# Patient Record
Sex: FEMALE | Race: WHITE
[De-identification: names, ages, dates, MRNs, and addresses within clinical notes are randomized per-mention and may not be internally consistent; named-entity substitution may affect disease eponyms.]

---

## 2018-11-02 ENCOUNTER — HOSPITAL ENCOUNTER (EMERGENCY)
Dept: HOSPITAL 44 - ED | Age: 38
LOS: 1 days | Discharge: HOME | End: 2018-11-03
Payer: SELF-PAY

## 2018-11-02 DIAGNOSIS — Z85.44: ICD-10-CM

## 2018-11-02 DIAGNOSIS — Z90.79: ICD-10-CM

## 2018-11-02 DIAGNOSIS — N10: Primary | ICD-10-CM

## 2018-11-02 LAB
BASOPHILS NFR BLD: 0.4 % (ref 0–1.5)
EGFR (NON-AFRICAN): > 60
EOSINOPHIL NFR BLD: 1.1 % (ref 0–6.8)
MCH RBC QN AUTO: 29.4 PG (ref 28–34)
MCV RBC AUTO: 89 FL (ref 80–100)
MONOCYTES %: 5.8 % (ref 0–11)
NEUTROPHILS #: 10 # K/UL (ref 1.4–7.7)

## 2018-11-02 PROCEDURE — 87186 SC STD MICRODIL/AGAR DIL: CPT

## 2018-11-02 PROCEDURE — 81002 URINALYSIS NONAUTO W/O SCOPE: CPT

## 2018-11-02 PROCEDURE — 96367 TX/PROPH/DG ADDL SEQ IV INF: CPT

## 2018-11-02 PROCEDURE — 87086 URINE CULTURE/COLONY COUNT: CPT

## 2018-11-02 PROCEDURE — 80053 COMPREHEN METABOLIC PANEL: CPT

## 2018-11-02 PROCEDURE — 74178 CT ABD&PLV WO CNTR FLWD CNTR: CPT

## 2018-11-02 PROCEDURE — 99285 EMERGENCY DEPT VISIT HI MDM: CPT

## 2018-11-02 PROCEDURE — 81025 URINE PREGNANCY TEST: CPT

## 2018-11-02 PROCEDURE — 85025 COMPLETE CBC W/AUTO DIFF WBC: CPT

## 2018-11-02 PROCEDURE — 96365 THER/PROPH/DIAG IV INF INIT: CPT

## 2018-11-02 PROCEDURE — S1016 NON-PVC INTRAVENOUS ADMINIST: HCPCS

## 2018-11-02 PROCEDURE — 96375 TX/PRO/DX INJ NEW DRUG ADDON: CPT

## 2018-11-02 NOTE — ED PHYSICIAN DOCUMENTATION
Flank Pain





- HISTORIAN


Historian: patient





- HPI


Stated Complaint: flank pain 


Chief Complaint: Flank Pain


Additional Information: 





Intro self as NP. Pt presents to the ED via EMS c/o Right flank pain since 033 

today. pain is 8/10 aching constant and radiates through her back  and around 

her right groin.  A0 pt has a hx of vulva cancer and has had 3 surgeries 

including tubal ligation. pt traveling through,  a . 





Pt reports nausea





pt denies current chest pain, dyspnea, syncope/near syncope, headache, 

dizziness, visual disturbances, v/d, fever, rash, sick contacts, dysuria, 

trauma. melena or hematochezia, change in bowel or bladder function. anxiety or 

depression. ROS Negative unless otherwise specified. 


Timing: worse


Severity: severe


Quality: aching


Associated Symptoms: nausea, back pain.  denies: fever, chills, vomiting, bloody

emesis, diarrhea, bloody stools, grossly bloody stools


Relieved by: nothing





- ROS


CONST: other (flank pain )


GI/: denies: constipation, black stools, bloody urine, bloody stools, dark 

urine, problems urinating


CVS/RESP: none


EYES/ENT: none


MS/SKIN/LYMPH: none


NEURO/PSYCH: none





- SOCIAL HX


Smoking History: less than 1 pack/day





- FAMILY HX


Family History: other (TIA Cancer asthma)





- PAST HX


Past History: other (vulva cancer with surgeries x 3 )


Surgeries/Procedures: 


Allergies: other (codeine )





- REVIEWED ASSESSMENTS


Nursing Assessment  Reviewed: Yes


Vitals Reviewed: Yes





ED Results Lab/Radiology





- Radiology


Radiology Impressions: 





CT abdomen and pelvis with and without contrast 





Date of study: 2018. 





CLINICAL HISTORY: SUDDEN ONSET MID BACK PAIN RADIATING TO RIGHT FLANK TODAY. 

CHRONIC HX OF HEMATURIA. HX OF VULVAR CANCER WITH SURGICAL REMOVAL AND VAGINAL 

REPAIR IN , CHOLECYSTECTOMY IN ,  X 3, TUBAL LIGATION LAST 

YEAR. WITHOUT AND WITH IV CONTRAST PER ORDER. 95 ML OMNI 350 IV CONTRAST GIVEN. 

(Hx) / ITS.REASON pain (DICOM Hx) 








TECHNIQUE: 


5 mm contiguous axial images of the abdomen and pelvis with and without IV 

contrast.  With and without; 95 ML OMNI 350 IV CONTRAST GIVEN   





FINDINGS: 


The lung bases are clear. 





Abdomen: The liver, pancreas and spleen are normal in appearance. Surgical clips

are present consistent with prior cholecystectomy. There is diminished 

enhancement the right renal lower pole with adjacent infiltration of the 

perinephric fat most consistent with right pyelonephritis. The left kidney enh

ances appropriately. There is no evidence of renal or ureteral calculi. The 

aorta is normal in caliber. The small and large bowel are nondistended. There is

no evidence of free air or free fluid. 





Pelvis: The colon is normal in appearance. The distal ureters and bladder are 

normal. There is no evidence of free air or free fluid. The sigmoid colon and 

rectum are normal. A right ovarian corpus luteum cyst is noted. The remaining 

pelvic structures are within normal limits and the bones of the pelvis are 

intact. 





IMPRESSION: 


Right renal lower pole pyelonephritis.


 


Electronically signed on Nov 3, 2018 12:09:24 AM CDT by:


Quita Aleman





- Orders


Orders: 





                                    ED Orders











 Category Date Time Status


 


 CBC/PLATELET/DIFF Routine Lab  18 Ordered


 


 CMP Routine Lab  18 Ordered


 


 URINALYSIS Routine Lab  18 Ordered


 


 URINE HCG Stat Lab  18 Uncollected


 


 Ketorolac Tromethamine [Toradol] Med  18 22:31 Once





 30 mg IVP NOW ONE   


 


 NORMAL SALINE @ 1000 MLS/HR ( 1000ml BOLUS) Med  18 22:34 Ordered





 0.9 % Sodium Chloride [Normal Saline] 1,000 ml   





 IV Q1H   


 


 Ondansetron HCl/Pf [Zofran 4 mg/2 ml] Med  18 22:33 Once





 4 mg IVP NOW ONE   














Abdominal Pain Physical Exam





- Physical Exam


General Appearance: mild distress


EENT: ENT inspection normal, no signs of dehydration


NECK: normal inspection.  No: lymphadenopathy


RESPIRATORY: no resp distress, chest non-tender, breath sounds normal.  No: 

wheezes, rales, rhonchi


CVS: reg rate & rhythm, heart sounds normal, equal pulses, no murmur


ABDOMEN: soft


BACK: CVA tenderness (R).  No: CVA tenderness (L)


SKIN: warm/dry, normal color


EXTREMITIES: non-tender


NEURO: oriented X3, motor nml, sensation nml, mood/affect nml, cognition normal





Discharge


Clincal Impression: 


 Pyelonephritis





Referrals: 


Primary Doctor,No [Primary Care Provider] - 2 Days


Additional Instructions: 


increase hydration 





Cipro 500 mg twice a day for 7 days





ibuprofen 600 mg every 6 hours for fever inflammation. 





tylenol 650 mg every 4-6 hours for fever/pain





we will send your urine off for culture to ensure you are on the appropriate 

antibiotic. we should have the result back in 3-4 days. 


you are welcome to call lab and check result if you have not heard anything. A 

Nurse will Review your culture report and contact you. 





Seek medical care immediately if worse: chest pain, shortness of breath, unable 

to urinate, feeling faint or passing out, or any concern. 





it is important that you follow up with your health care provider next week for 

urine recheck. 





UNDERSTAND THAT THIS IS AN EMERGENCY EVALUATION FOR YOUR COMPLAINT AND BY NATURE

IS LIMITED AND NOT A SUBSTITUTE FOR ONGOING MEDICAL CARE. EVEN THOUGH TEST 

RESULTS AND TREATMENT PLAN WERE EXPLAINED THERE MAY BE A NEED FOR ADDITIONAL 

TESTING TO FULLY DETERMINE THE EXTENT OF YOUR ILLNESS/INJURY/OR CONCERN SO YOU 

SHOULD CONTACT AND OR ESTABLISH WITH A PRIMARY CARE PROVIDER (OR REFERRAL DOCTOR

IF APPLICABLE) FOR AN APPOINTMENT AS SOON AS POSSIBLE.





Condition: Good


Disposition: 01 HOME, SELF-CARE


Decision to Admit: NO


Date of Decison to Admit: 18


Decision Time: 00:22

## 2018-11-03 VITALS — SYSTOLIC BLOOD PRESSURE: 100 MMHG | DIASTOLIC BLOOD PRESSURE: 61 MMHG

## 2018-11-03 LAB
APPEARANCE UR: CLEAR
COLOR,URINE: YELLOW
PH UR STRIP: 7 [PH] (ref 5–8)
RBC UR QL: (no result)
UROBILINOGEN URINE: 1 EU (ref 0.2–1)

## 2018-11-03 NOTE — DIAGNOSTIC IMAGING REPORT
SG KOLB 

[facility]

[id number] [facility] [address] [place] [address]. [phone]

 

 

 

 

Report Submission Date: Nov 3, 2018 12:09:24 AM CDT

Patient       Study

Name:   LANI DORAN       Date:   2018 11:40:02 PM CDT

MRN:   [id number]       Modality Type:   CT

Gender:   F       Description:   CT ABD PELVIS W W/

:   82       Institution:   [facility]

Physician:   SG KOLB

     Accession:    [id number]

 

 

CT abdomen and pelvis with and without contrast 



Date of study: 2018. 



CLINICAL HISTORY: SUDDEN ONSET MID BACK PAIN RADIATING TO RIGHT FLANK TODAY. 
CHRONIC HX OF HEMATURIA. HX OF VULVAR CANCER WITH SURGICAL REMOVAL AND VAGINAL 
REPAIR IN , CHOLECYSTECTOMY IN ,  X 3, TUBAL LIGATION LAST 
YEAR. WITHOUT AND WITH IV CONTRAST PER ORDER. 95 ML OMNI 350 IV CONTRAST GIVEN. 
(Hx) / ITS.REASON pain (DICOM Hx) 





TECHNIQUE: 

5 mm contiguous axial images of the abdomen and pelvis with and without IV 
contrast.  With and without; 95 ML OMNI 350 IV CONTRAST GIVEN   



FINDINGS: 

The lung bases are clear. 



Abdomen: The liver, pancreas and spleen are normal in appearance. Surgical clips
are present consistent with prior cholecystectomy. There is diminished 
enhancement the right renal lower pole with adjacent infiltration of the 
perinephric fat most consistent with right pyelonephritis. The left kidney 
enhances appropriately. There is no evidence of renal or ureteral calculi. The 
aorta is normal in caliber. The small and large bowel are nondistended. There is
no evidence of free air or free fluid. 



Pelvis: The colon is normal in appearance. The distal ureters and bladder are 
normal. There is no evidence of free air or free fluid. The sigmoid colon and 
rectum are normal. A right ovarian corpus luteum cyst is noted. The remaining 
pelvic structures are within normal limits and the bones of the pelvis are 
intact. 



IMPRESSION: 

Right renal lower pole pyelonephritis.

 

Electronically signed on Nov 3, 2018 12:09:24 AM CDT by:

Quita CANTRELL